# Patient Record
Sex: FEMALE | Race: WHITE | Employment: OTHER | URBAN - METROPOLITAN AREA
[De-identification: names, ages, dates, MRNs, and addresses within clinical notes are randomized per-mention and may not be internally consistent; named-entity substitution may affect disease eponyms.]

---

## 2017-12-06 ENCOUNTER — OFFICE VISIT (OUTPATIENT)
Dept: NEUROLOGY | Facility: CLINIC | Age: 67
End: 2017-12-06

## 2017-12-06 VITALS
DIASTOLIC BLOOD PRESSURE: 64 MMHG | SYSTOLIC BLOOD PRESSURE: 110 MMHG | WEIGHT: 111 LBS | HEART RATE: 66 BPM | RESPIRATION RATE: 14 BRPM

## 2017-12-06 DIAGNOSIS — R41.840 DIFFICULTY CONCENTRATING: Primary | ICD-10-CM

## 2017-12-06 DIAGNOSIS — F41.9 ANXIETY: ICD-10-CM

## 2017-12-06 DIAGNOSIS — G31.84 MILD COGNITIVE IMPAIRMENT: ICD-10-CM

## 2017-12-06 PROCEDURE — 99205 OFFICE O/P NEW HI 60 MIN: CPT | Performed by: OTHER

## 2017-12-06 RX ORDER — LEVOTHYROXINE SODIUM 0.07 MG/1
75 TABLET ORAL
COMMUNITY

## 2017-12-06 RX ORDER — ESCITALOPRAM OXALATE 5 MG/1
5 TABLET ORAL NIGHTLY
COMMUNITY

## 2017-12-06 RX ORDER — MONTELUKAST SODIUM 10 MG/1
10 TABLET ORAL DAILY
COMMUNITY

## 2017-12-06 RX ORDER — ASPIRIN 81 MG/1
81 TABLET ORAL DAILY
COMMUNITY

## 2017-12-06 NOTE — PATIENT INSTRUCTIONS
After your visit at the [facility] office  today,  please direct any follow up questions or medication needs to the staff in our  [place] office so that your concerns may be promptly addressed.   We are available through [employer] or at the numbers below: office has notified you that the test has been approved by your insurer. Depending on your insurance carrier, approval may take 3-10 days. It is highly recommended patients contact their insurance carrier directly to determine coverage.   If test is done w

## 2017-12-06 NOTE — PROGRESS NOTES
[address] [address]   Neurology- INITIAL CLINIC VISIT  2017, 2:11 PM     Sang Soler Patient Status:  No patient class for patient encounter    1950 MRN [id number]   [facility] dementia    Social History:   reports that she quit smoking about 35 years ago. She has never used smokeless tobacco. She reports that she drinks alcohol. She reports that she does not use drugs.     Allergies:    Tobramycin              Other (See Comments proximal and distal muscles of the arms and legs. Deep tendon reflexes were 2 at the biceps, brachioradialis, triceps, knee jerk, and ankle jerk. Plantar responses were flexor bilaterally. Sensory exam revealed normal light touch perception.  Vibratory p above    3. Anxiety  As above      No prescriptions requested or ordered in this encounter          We discussed in depth regarding the diagnosis, prognosis, treatment. The patient was given ample opportunity to ask questions.  All questions and concerns we

## 2017-12-06 NOTE — PROGRESS NOTES
Patient here for evaluation of memory loss over the last 6 months. Did have a similar memory loss concern back in 2011 as well.

## 2018-03-09 ENCOUNTER — TELEPHONE (OUTPATIENT)
Dept: NEUROLOGY | Facility: CLINIC | Age: 68
End: 2018-03-09

## 2018-10-17 NOTE — PATIENT INSTRUCTIONS
After your visit at the [facility] office  today,  please direct any follow up questions or medication needs to the staff in our  [place] office so that your concerns may be promptly addressed.   We are available through [employer] or at the numbers below: Tests:    If your physician has ordered radiology tests such as MRI or CT scans, do not schedule the test until this office has notified you that the test has been approved by your insurer. Depending on your insurance carrier, approval may take 3-10 days. • Failure to follow above steps may result in the delay of form completion.

## 2018-10-17 NOTE — PROGRESS NOTES
Patient here to follow up regarding memory loss and trouble with concentration. States she is still experiencing symptoms.

## 2018-10-17 NOTE — PROGRESS NOTES
Andreina [address]27   Neurology- follow up    Geovany Jose Patient Status:  No patient class for patient encounter    1950 MRN [id number]   Location [address] [place] [address], [address] [place] [address] PCP SOFIYA Thompson in her maternal grandfather; Heart Disorder in her paternal grandmother; Hypertension in her father and mother; Neurological Disorder in her father; Psychiatric in her paternal grandfather; Stroke in her maternal grandmother and paternal grandfather;  Thyro difficulty with cube and trails  Cranial nerves II-XII: Optic discs were sharp. Pupils were round and reacted to light. Extraocular movements were full. There was no face, jaw, palate or tongue weakness or atrophy. Hearing was grossly intact.  Shoulder shru questions and concerns were addressed. This is a 25 minute visit and greater than 50% of the time was spent counseling the patient and/or coordinating care, and/or reviewing imaging with the patient.       Michaela Corrales, DO  Neurology and Neuromuscular med

## 2019-01-10 DIAGNOSIS — G31.84 MILD COGNITIVE IMPAIRMENT: ICD-10-CM

## 2019-01-10 NOTE — TELEPHONE ENCOUNTER
Medication: ESCITALOPRAM 10 MG TABLETS    Date of last refill: 10/17/18 (#30/1)  [place]    Last office visit: 10/17/2018  Due back to clinic per last office note:  Around 03/17/19  Date next office visit scheduled:

## 2019-01-11 RX ORDER — ESCITALOPRAM OXALATE 10 MG/1
TABLET ORAL
Qty: 90 TABLET | Refills: 0 | Status: SHIPPED | OUTPATIENT
Start: 2019-01-11 | End: 2019-11-20

## 2019-01-11 NOTE — TELEPHONE ENCOUNTER
Medication: escitalopram 10 MG Oral Tab-patient requesting 90 Day Supply    Date of last refill: yesterday 1.11.19 (#30/1)  [place]    Last office visit: 10/17/2018  Due back to clinic per last office note:  5 months

## 2019-02-22 ENCOUNTER — TELEPHONE (OUTPATIENT)
Dept: NEUROLOGY | Facility: CLINIC | Age: 69
End: 2019-02-22

## 2019-07-23 DIAGNOSIS — G31.84 MILD COGNITIVE IMPAIRMENT: ICD-10-CM

## 2019-07-24 RX ORDER — ESCITALOPRAM OXALATE 10 MG/1
TABLET ORAL
Qty: 90 TABLET | Refills: 0 | OUTPATIENT
Start: 2019-07-24

## 2019-07-24 NOTE — TELEPHONE ENCOUNTER
Patient infoemed she is over-due for f/u       asaf't with Dr Karis Colon and states will call back for asaf't. Patient also states she would like her PCP (Dr Melissa Pedroza) to refill Rx Escitalopram and she will contact him.     Medication: ESCITALOPRAM 10 MG    Date o

## 2019-11-20 NOTE — PROGRESS NOTES
Pt following up for memory loss. Pt feels like her memory has gotten worse.  states pt asks the same questions over and over.

## 2019-11-20 NOTE — PROGRESS NOTES
Andreina [address]27   Neurology- follow up    Myriam Lo Patient Status:  No patient class for patient encounter    1950 MRN [id number]   Location [address] [place] [address], [address] [place] [address] PCP SOFIYA Rojo       Re report, only slight worsening of short term memory    Past Medical History:  Past Medical History:   Diagnosis Date   • Anxiety    • Back pain    • Miscarriage     Hypothyroid     Past Surgical History:  Past Surgical History:   Procedure Laterality Date in no acute distress  Cardiac: Normal rate & regular rhythm  Lungs: Clear to auscultation bilaterally  Skin: There are no rashes or other skin lesions.   Musculoskeletal: There is no scoliosis, or joint deformities  Neurologic examination:  Mental status: P exercises    Requested Prescriptions      No prescriptions requested or ordered in this encounter          We discussed in depth regarding the diagnosis, prognosis, treatment. The patient was given ample opportunity to ask questions.  All questions and conc

## 2019-11-20 NOTE — PATIENT INSTRUCTIONS
After your visit at the [facility] office  today,  please direct any follow up questions or medication needs to the staff in our  [facility] office so that your concerns may be promptly addressed.   We are available through [employer] or at the numbers below: must be picked up in office. • Please allow the office 2-3 business days to fill the prescription. • Patient must present photo ID at time of . PLEASE NOTE: PRESCRIPTIONS MUST BE PICKED UP PRIOR TO 3:00PM MONDAY-FRIDAY    Scheduling Tests:     If submitting forms to office staff. • Form completion may require an additional fee. • A signed Release of Information (NELLY) must be on file before forms may be submitted. When dropping off forms, please ask the  for this paper.    • Failure

## 2020-02-03 PROBLEM — G31.84 MCI (MILD COGNITIVE IMPAIRMENT): Status: ACTIVE | Noted: 2020-02-03

## 2022-10-27 ENCOUNTER — TELEPHONE (OUTPATIENT)
Dept: OBGYN | Age: 72
End: 2022-10-27